# Patient Record
Sex: FEMALE | Race: OTHER | HISPANIC OR LATINO
[De-identification: names, ages, dates, MRNs, and addresses within clinical notes are randomized per-mention and may not be internally consistent; named-entity substitution may affect disease eponyms.]

---

## 2022-10-11 PROBLEM — Z00.00 ENCOUNTER FOR PREVENTIVE HEALTH EXAMINATION: Status: ACTIVE | Noted: 2022-10-11

## 2022-10-12 ENCOUNTER — RESULT REVIEW (OUTPATIENT)
Age: 37
End: 2022-10-12

## 2022-10-12 ENCOUNTER — APPOINTMENT (OUTPATIENT)
Dept: OBGYN | Facility: HOSPITAL | Age: 37
End: 2022-10-12

## 2022-10-12 ENCOUNTER — OUTPATIENT (OUTPATIENT)
Dept: OUTPATIENT SERVICES | Facility: HOSPITAL | Age: 37
LOS: 1 days | End: 2022-10-12

## 2022-10-12 VITALS
SYSTOLIC BLOOD PRESSURE: 125 MMHG | DIASTOLIC BLOOD PRESSURE: 77 MMHG | BODY MASS INDEX: 18.83 KG/M2 | HEIGHT: 65 IN | WEIGHT: 113 LBS | TEMPERATURE: 97.5 F | HEART RATE: 74 BPM

## 2022-10-12 DIAGNOSIS — N63.10 UNSPECIFIED LUMP IN THE RIGHT BREAST, UNSPECIFIED QUADRANT: ICD-10-CM

## 2022-10-12 DIAGNOSIS — D25.9 LEIOMYOMA OF UTERUS, UNSPECIFIED: ICD-10-CM

## 2022-10-12 PROCEDURE — 99203 OFFICE O/P NEW LOW 30 MIN: CPT | Mod: GE

## 2022-10-13 DIAGNOSIS — N63.10 UNSPECIFIED LUMP IN THE RIGHT BREAST, UNSPECIFIED QUADRANT: ICD-10-CM

## 2022-10-13 DIAGNOSIS — Z12.4 ENCOUNTER FOR SCREENING FOR MALIGNANT NEOPLASM OF CERVIX: ICD-10-CM

## 2022-10-13 DIAGNOSIS — R10.2 PELVIC AND PERINEAL PAIN: ICD-10-CM

## 2022-10-13 DIAGNOSIS — Z11.3 ENCOUNTER FOR SCREENING FOR INFECTIONS WITH A PREDOMINANTLY SEXUAL MODE OF TRANSMISSION: ICD-10-CM

## 2022-10-13 LAB
C TRACH RRNA SPEC QL NAA+PROBE: SIGNIFICANT CHANGE UP
HPV HIGH+LOW RISK DNA PNL CVX: SIGNIFICANT CHANGE UP
N GONORRHOEA RRNA SPEC QL NAA+PROBE: SIGNIFICANT CHANGE UP
SPECIMEN SOURCE: SIGNIFICANT CHANGE UP

## 2022-10-14 DIAGNOSIS — D25.9 LEIOMYOMA OF UTERUS, UNSPECIFIED: ICD-10-CM

## 2022-10-14 NOTE — PHYSICAL EXAM
[Chaperone Present] : A chaperone was present in the examining room during all aspects of the physical examination [Appropriately responsive] : appropriately responsive [Alert] : alert [No Acute Distress] : no acute distress [Soft] : soft [Non-tender] : non-tender [Non-distended] : non-distended [FreeTextEntry1] : No lesions. Clitoral moffett piercing noted  [FreeTextEntry4] : Physiologic discharge in the vault. No bleeding  [FreeTextEntry5] : No lesions seen. IUD strings visible. No CMT  [FreeTextEntry6] : ~12wk size mobile uterus. Non-tender. No adnexal tenderness bilaterally or masses appreciated.

## 2022-10-14 NOTE — HISTORY OF PRESENT ILLNESS
[FreeTextEntry1] : 37yo , amenorrheic on Mirena, who presents for approximately h0hrgic of pelvic/periumbilical pain. Pain noted to be worsening especially over the past x9mo w/ sexual activity and strenuous activity. Has tried OTC analgesia w/o significant improvement in pain. Pain described as sharp and cramping, akin to labor pain. Patient mentions that about x1mo ago, she was a standardized patient for a sonography training course and was told that her uterus was "80% full." She denies weight loss, but has early satiety. Denies n/v, issues with BM, dysuria, urgency, frequency, fevers. \par \par Patient says she was told by an ob/gyn prior around 8 years ago and describes having a procedure to "remove" the fibroid. Patient pointed to her groin and states she awoke with an incision there. Denies having any issues with vaginal bleeding \par \par Mentions noticing a breast mass on her right breast as well. Denies any nipple discharge or family hx of breast cancer  \par \par ObHx:\par - 21yo w/ ToP via D&C\par - sAb \par - (/) IUFD? c/b PPH s/p 6U of pRBCs\par - VDx5 (, , , , )\par - Mirena (placed 2yrs ago). Had been on Depo prior\par - States last pap was 2years ago and was normal\par GynHx: Fibroids s/p [place] vs. hysteroscopic myomectomy x8 years ago. Denies any STI hx. Says she gets tested regularly \par - Sexually active. Bisexual\par MedHx: Denies\par SurgHx: Denies \par Meds: Vitamins \par Allergies: NKDA\par FamHx: Denies any family hx of uterine, ovarian, or breast cancer \par SocHx: Denies any tobacco or alcohol use. Quit smoking x2 years ago. Had been smoking from [age]yo. Works as a medical assistant

## 2022-10-14 NOTE — PLAN
[FreeTextEntry1] : 35yo  who presents for evaluation of periumbilical/pelvic pain in the setting of reported fibroids \par \par #Pelvic pain\par - Consider fibroids as etiology vs. embedded IUD vs. ovarian cysts. TVUS ordered to evaluate for structural etiology. No fevers, chills, or CMT to suggest PID\par - Recommended she continue OTC analgesia in the interim \par \par #Rt Breast mass\par - Breast ultrasound and diagnostic mammogram ordered \par \par #HM\par - Pap w/ HPV obtained and sent\par - GC obtained and sent. Offered other STD testing, but patient declined any blood work. States she is up to date  \par \par Jay Brown, PGY-1\par d/w Dr. Dey\par \par RTC after obtaining TVUS

## 2022-10-17 LAB — CYTOLOGY SPEC DOC CYTO: SIGNIFICANT CHANGE UP
